# Patient Record
Sex: FEMALE | Race: ASIAN | NOT HISPANIC OR LATINO
[De-identification: names, ages, dates, MRNs, and addresses within clinical notes are randomized per-mention and may not be internally consistent; named-entity substitution may affect disease eponyms.]

---

## 2020-07-09 ENCOUNTER — APPOINTMENT (OUTPATIENT)
Dept: CARDIOLOGY | Facility: CLINIC | Age: 85
End: 2020-07-09
Payer: MEDICARE

## 2020-07-09 VITALS — RESPIRATION RATE: 16 BRPM | HEART RATE: 79 BPM | WEIGHT: 103 LBS

## 2020-07-09 DIAGNOSIS — I10 ESSENTIAL (PRIMARY) HYPERTENSION: ICD-10-CM

## 2020-07-09 DIAGNOSIS — E78.5 HYPERLIPIDEMIA, UNSPECIFIED: ICD-10-CM

## 2020-07-09 PROBLEM — Z00.00 ENCOUNTER FOR PREVENTIVE HEALTH EXAMINATION: Status: ACTIVE | Noted: 2020-07-09

## 2020-07-09 PROCEDURE — 99214 OFFICE O/P EST MOD 30 MIN: CPT

## 2020-07-09 PROCEDURE — 93000 ELECTROCARDIOGRAM COMPLETE: CPT

## 2020-07-09 RX ORDER — OLMESARTAN MEDOXOMIL 20 MG/1
20 TABLET, FILM COATED ORAL
Refills: 0 | Status: ACTIVE | COMMUNITY
Start: 2020-07-09

## 2020-07-09 RX ORDER — PITAVASTATIN CALCIUM 2.09 MG/1
2 TABLET, FILM COATED ORAL
Refills: 0 | Status: ACTIVE | COMMUNITY
Start: 2020-07-09

## 2020-07-09 RX ORDER — ASPIRIN 81 MG/1
81 TABLET ORAL
Refills: 0 | Status: ACTIVE | COMMUNITY
Start: 2020-07-09

## 2020-07-09 NOTE — HISTORY OF PRESENT ILLNESS
[FreeTextEntry1] : \par 1. HTN: on medications, controlled. No SE noted.\par \par 2. Hyperlipidemia: on livalo. Last . \par \par 3. AF: new. Patient recently had rectal surgery and was discharged in late June 2020. She was found to be in rapid AF HR 110s (new). She had mild palpitations but no dizziness. No CP or SOB. ET is limited post surgical status. She was found to be dehydrated and given IVF and now feels improved. She is in NSR today. Echo at [facility] showed LVEF 65%. \par \par She then went back into NSR but then on July 6th, went into AF again at a rate of 110s. Mild palpitations noted. No BB were given. She has taken ASA intermittently due to concerns about bleeding.\par \par No CP or SOB. ET is limited and she is in a wheelchair.\par \par \par \par PMHx/PSHx: reviewed and same as above \par \par Meds: benicar 20 livalo 2 \par \par ALL: NKDA       \par \par

## 2020-07-09 NOTE — PHYSICAL EXAM
[Normal Appearance] : normal appearance [General Appearance - Well Developed] : well developed [General Appearance - Well Nourished] : well nourished [Well Groomed] : well groomed [No Deformities] : no deformities [General Appearance - In No Acute Distress] : no acute distress [Normal Conjunctiva] : the conjunctiva exhibited no abnormalities [Eyelids - No Xanthelasma] : the eyelids demonstrated no xanthelasmas [Normal Oral Mucosa] : normal oral mucosa [No Oral Cyanosis] : no oral cyanosis [No Oral Pallor] : no oral pallor [Normal Jugular Venous V Waves Present] : normal jugular venous V waves present [Normal Jugular Venous A Waves Present] : normal jugular venous A waves present [No Jugular Venous Ruiz A Waves] : no jugular venous ruiz A waves [Respiration, Rhythm And Depth] : normal respiratory rhythm and effort [Exaggerated Use Of Accessory Muscles For Inspiration] : no accessory muscle use [Auscultation Breath Sounds / Voice Sounds] : lungs were clear to auscultation bilaterally [Heart Rate And Rhythm] : heart rate and rhythm were normal [Heart Sounds] : normal S1 and S2 [Murmurs] : no murmurs present [Abdomen Tenderness] : non-tender [Abdomen Soft] : soft [Abdomen Mass (___ Cm)] : no abdominal mass palpated [Nail Clubbing] : no clubbing of the fingernails [Cyanosis, Localized] : no localized cyanosis [Petechial Hemorrhages (___cm)] : no petechial hemorrhages [] : no rash [Skin Color & Pigmentation] : normal skin color and pigmentation [No Venous Stasis] : no venous stasis [Skin Lesions] : no skin lesions [No Skin Ulcers] : no skin ulcer [No Xanthoma] : no  xanthoma was observed [Oriented To Time, Place, And Person] : oriented to person, place, and time [Affect] : the affect was normal [Mood] : the mood was normal [No Anxiety] : not feeling anxious

## 2020-07-09 NOTE — REASON FOR VISIT
[Follow-Up - Clinic] : a clinic follow-up of [Atrial Fibrillation] : atrial fibrillation [Hypertension] : hypertension [FreeTextEntry1] : PMD: Dr. Kaden Oliver\par \par CC:  AF\par \par HPI:  87 F HTN hyperlipidemia with new AF.\par

## 2020-07-09 NOTE — DISCUSSION/SUMMARY
[FreeTextEntry1] : 88 F HTN hyperlipidemia with paroxysmal AF.\par Recommend use BB as tolerated.\par If bleeding improves, start ASA 81 QD.\par CHECK HOLTER DUE TO PALPITATIONS.\par CHECK NST TO ASSESS FOR ISCHEMIA (pharmacologic, limited ET wheelchair bound).\par

## 2020-07-31 ENCOUNTER — APPOINTMENT (OUTPATIENT)
Dept: CARDIOLOGY | Facility: CLINIC | Age: 85
End: 2020-07-31
Payer: MEDICARE

## 2020-07-31 DIAGNOSIS — I48.91 UNSPECIFIED ATRIAL FIBRILLATION: ICD-10-CM

## 2020-07-31 PROCEDURE — 93015 CV STRESS TEST SUPVJ I&R: CPT

## 2020-07-31 PROCEDURE — 78452 HT MUSCLE IMAGE SPECT MULT: CPT

## 2020-07-31 PROCEDURE — A9500: CPT

## 2020-08-11 ENCOUNTER — NON-APPOINTMENT (OUTPATIENT)
Age: 85
End: 2020-08-11

## 2021-01-25 ENCOUNTER — NON-APPOINTMENT (OUTPATIENT)
Age: 86
End: 2021-01-25

## 2021-01-25 RX ORDER — METOPROLOL TARTRATE 25 MG/1
25 TABLET, FILM COATED ORAL
Refills: 0 | Status: DISCONTINUED | COMMUNITY
Start: 2020-07-09 | End: 2021-01-25

## 2021-01-25 RX ORDER — METOPROLOL SUCCINATE 25 MG/1
25 TABLET, EXTENDED RELEASE ORAL
Refills: 0 | Status: ACTIVE | COMMUNITY
Start: 2021-01-25

## 2022-04-28 DIAGNOSIS — K92.2 GASTROINTESTINAL HEMORRHAGE, UNSPECIFIED: ICD-10-CM

## 2022-04-28 DIAGNOSIS — Z01.812 ENCOUNTER FOR PREPROCEDURAL LABORATORY EXAMINATION: ICD-10-CM

## 2022-05-09 LAB — SARS-COV-2 N GENE NPH QL NAA+PROBE: NOT DETECTED

## 2022-05-11 ENCOUNTER — OUTPATIENT (OUTPATIENT)
Dept: OUTPATIENT SERVICES | Facility: HOSPITAL | Age: 87
LOS: 1 days | End: 2022-05-11
Payer: MEDICARE

## 2022-05-11 ENCOUNTER — APPOINTMENT (OUTPATIENT)
Dept: GASTROENTEROLOGY | Facility: HOSPITAL | Age: 87
End: 2022-05-11

## 2022-05-11 ENCOUNTER — TRANSCRIPTION ENCOUNTER (OUTPATIENT)
Age: 87
End: 2022-05-11

## 2022-05-11 VITALS
DIASTOLIC BLOOD PRESSURE: 81 MMHG | RESPIRATION RATE: 17 BRPM | SYSTOLIC BLOOD PRESSURE: 139 MMHG | HEART RATE: 73 BPM | OXYGEN SATURATION: 100 %

## 2022-05-11 VITALS
HEIGHT: 61 IN | DIASTOLIC BLOOD PRESSURE: 82 MMHG | RESPIRATION RATE: 17 BRPM | OXYGEN SATURATION: 98 % | WEIGHT: 98.11 LBS | TEMPERATURE: 98 F | SYSTOLIC BLOOD PRESSURE: 160 MMHG | HEART RATE: 74 BPM

## 2022-05-11 DIAGNOSIS — K92.2 GASTROINTESTINAL HEMORRHAGE, UNSPECIFIED: ICD-10-CM

## 2022-05-11 DIAGNOSIS — Z98.890 OTHER SPECIFIED POSTPROCEDURAL STATES: Chronic | ICD-10-CM

## 2022-05-11 DIAGNOSIS — Z90.710 ACQUIRED ABSENCE OF BOTH CERVIX AND UTERUS: Chronic | ICD-10-CM

## 2022-05-11 PROCEDURE — C1889: CPT

## 2022-05-11 PROCEDURE — 45334 SIGMOIDOSCOPY FOR BLEEDING: CPT | Mod: GC

## 2022-05-11 PROCEDURE — 45330 DIAGNOSTIC SIGMOIDOSCOPY: CPT

## 2022-05-11 DEVICE — CLIP HEMO INSTINCT PLUS ENDOSCOPIC: Type: IMPLANTABLE DEVICE | Status: FUNCTIONAL

## 2022-05-11 DEVICE — CLIP RESOLUTION 360 235CM: Type: IMPLANTABLE DEVICE | Status: FUNCTIONAL

## 2022-05-11 RX ORDER — SODIUM CHLORIDE 9 MG/ML
1000 INJECTION, SOLUTION INTRAVENOUS
Refills: 0 | Status: DISCONTINUED | OUTPATIENT
Start: 2022-05-11 | End: 2022-05-25

## 2022-05-11 RX ORDER — METOPROLOL TARTRATE 50 MG
0.5 TABLET ORAL
Qty: 0 | Refills: 0 | DISCHARGE

## 2022-05-11 RX ORDER — ASPIRIN/CALCIUM CARB/MAGNESIUM 324 MG
1 TABLET ORAL
Qty: 0 | Refills: 0 | DISCHARGE

## 2022-05-11 RX ORDER — SODIUM CHLORIDE 9 MG/ML
500 INJECTION INTRAMUSCULAR; INTRAVENOUS; SUBCUTANEOUS
Refills: 0 | Status: DISCONTINUED | OUTPATIENT
Start: 2022-05-11 | End: 2022-05-25

## 2022-05-11 RX ORDER — OLMESARTAN MEDOXOMIL 5 MG/1
1 TABLET, FILM COATED ORAL
Qty: 0 | Refills: 0 | DISCHARGE

## 2022-05-11 RX ORDER — PITAVASTATIN CALCIUM 1.04 MG/1
1 TABLET, FILM COATED ORAL
Qty: 0 | Refills: 0 | DISCHARGE

## 2022-05-11 NOTE — PRE-ANESTHESIA EVALUATION ADULT - NSANTHPMHFT_GEN_ALL_CORE
89 yo F w/ PMHx of HTN, HLF, PAF bw/ rectal bleeding. Presents today for flexible sigmoidoscopy.    Denies active CP/SOB/Orthopnea  Denies hx of MI/CHF

## 2022-05-11 NOTE — ASU PATIENT PROFILE, ADULT - FALL HARM RISK - HARM RISK INTERVENTIONS

## 2022-05-11 NOTE — PRE-ANESTHESIA EVALUATION ADULT - NSANTHOSAYNRD_GEN_A_CORE
No. VITALIY screening performed.  STOP BANG Legend: 0-2 = LOW Risk; 3-4 = INTERMEDIATE Risk; 5-8 = HIGH Risk

## 2022-05-11 NOTE — ASU DISCHARGE PLAN (ADULT/PEDIATRIC) - NS MD DC FALL RISK RISK
For information on Fall & Injury Prevention, visit: [email or web address] OR  [email or web address] OR  https://www.cdc.gov/steadi/patient.html

## 2022-05-11 NOTE — PRE PROCEDURE NOTE - PRE PROCEDURE EVALUATION
Attending Physician:   Dr. Jeff Ruiz                         Procedure: Flex Sig     Indication for Procedure: evaluate for source of GIB   ________________________________________________________  PAST MEDICAL & SURGICAL HISTORY:    ALLERGIES:  Allergy Status Unknown    HOME MEDICATIONS:    AICD/PPM: [ ] yes   [ ] no    PERTINENT LAB DATA:                      PHYSICAL EXAMINATION:    T(C): --  HR: --  BP: --  RR: --  SpO2: --    Constitutional: NAD  HEENT: PERRLA, EOMI,    Neck:  No JVD  Respiratory: CTAB/L  Cardiovascular: S1 and S2  Gastrointestinal: BS+, soft, NT/ND  Extremities: No peripheral edema  Neurological: A/O x 3, no focal deficits  Psychiatric: Normal mood, normal affect  Skin: No rashes    ASA Class: I [ ]  II [ ]  III [ ]  IV [ ]    COMMENTS:    The patient is a suitable candidate for the planned procedure unless box checked [ ]  No, explain:

## 2023-05-26 ENCOUNTER — NON-APPOINTMENT (OUTPATIENT)
Age: 88
End: 2023-05-26

## 2024-04-04 PROBLEM — I10 ESSENTIAL (PRIMARY) HYPERTENSION: Chronic | Status: ACTIVE | Noted: 2022-05-11

## 2024-04-04 PROBLEM — E78.00 PURE HYPERCHOLESTEROLEMIA, UNSPECIFIED: Chronic | Status: ACTIVE | Noted: 2022-05-11

## 2024-04-04 PROBLEM — I48.91 UNSPECIFIED ATRIAL FIBRILLATION: Chronic | Status: ACTIVE | Noted: 2022-05-11

## 2024-08-30 ENCOUNTER — NON-APPOINTMENT (OUTPATIENT)
Age: 89
End: 2024-08-30

## 2024-08-30 ENCOUNTER — APPOINTMENT (OUTPATIENT)
Dept: ELECTROPHYSIOLOGY | Facility: CLINIC | Age: 89
End: 2024-08-30
Payer: MEDICARE

## 2024-08-30 VITALS
OXYGEN SATURATION: 98 % | SYSTOLIC BLOOD PRESSURE: 93 MMHG | BODY MASS INDEX: 19.26 KG/M2 | HEIGHT: 61 IN | RESPIRATION RATE: 14 BRPM | HEART RATE: 101 BPM | DIASTOLIC BLOOD PRESSURE: 65 MMHG | WEIGHT: 102 LBS

## 2024-08-30 DIAGNOSIS — I48.91 UNSPECIFIED ATRIAL FIBRILLATION: ICD-10-CM

## 2024-08-30 DIAGNOSIS — G20.A1 PARKINSON'S DISEASE WITHOUT DYSKINESIA, WITHOUT MENTION OF FLUCTUATIONS: ICD-10-CM

## 2024-08-30 PROCEDURE — 99205 OFFICE O/P NEW HI 60 MIN: CPT

## 2024-08-30 PROCEDURE — 93000 ELECTROCARDIOGRAM COMPLETE: CPT

## 2024-08-30 RX ORDER — CALCIUM CARBONATE 500(1250)
TABLET,CHEWABLE ORAL DAILY
Refills: 0 | Status: ACTIVE | COMMUNITY

## 2024-08-30 RX ORDER — CARBIDOPA AND LEVODOPA 25; 100 MG/1; MG/1
25-100 TABLET ORAL 3 TIMES DAILY
Refills: 0 | Status: ACTIVE | COMMUNITY

## 2024-08-30 NOTE — PHYSICAL EXAM
[Well Developed] : well developed [Well Nourished] : well nourished [No Acute Distress] : no acute distress [Normal Conjunctiva] : normal conjunctiva [Normal Venous Pressure] : normal venous pressure [Normal S1, S2] : normal S1, S2 [No Murmur] : no murmur [No Rub] : no rub [No Gallop] : no gallop [Clear Lung Fields] : clear lung fields [Good Air Entry] : good air entry [No Respiratory Distress] : no respiratory distress  [Soft] : abdomen soft [Non Tender] : non-tender [No Masses/organomegaly] : no masses/organomegaly [Normal Bowel Sounds] : normal bowel sounds [No Edema] : no edema [No Cyanosis] : no cyanosis [No Clubbing] : no clubbing [No Varicosities] : no varicosities [No Rash] : no rash [No Skin Lesions] : no skin lesions [Alert and Oriented] : alert and oriented [de-identified] : tired-appearing, occasionally sleeping in her chair [de-identified] : irregularly irregular [de-identified] : wheelchair-bound

## 2024-08-30 NOTE — PHYSICAL EXAM
[Well Developed] : well developed [Well Nourished] : well nourished [No Acute Distress] : no acute distress [Normal Conjunctiva] : normal conjunctiva [Normal Venous Pressure] : normal venous pressure [Normal S1, S2] : normal S1, S2 [No Murmur] : no murmur [No Rub] : no rub [No Gallop] : no gallop [Clear Lung Fields] : clear lung fields [Good Air Entry] : good air entry [No Respiratory Distress] : no respiratory distress  [Soft] : abdomen soft [Non Tender] : non-tender [No Masses/organomegaly] : no masses/organomegaly [Normal Bowel Sounds] : normal bowel sounds [No Edema] : no edema [No Cyanosis] : no cyanosis [No Clubbing] : no clubbing [No Varicosities] : no varicosities [No Rash] : no rash [No Skin Lesions] : no skin lesions [Alert and Oriented] : alert and oriented [de-identified] : tired-appearing, occasionally sleeping in her chair [de-identified] : irregularly irregular [de-identified] : wheelchair-bound

## 2024-08-30 NOTE — CARDIOLOGY SUMMARY
[de-identified] : ECG from 8/30/2024: Atrial fibrillation at 95bpm ECG from 7/6/2020: AF [de-identified] : Pharmacologic nuclear stress test from 7/31/2020: small, mild defect in the inferior wall this is fixed, suggestive of diaphragmatic attenuation artifact, normal myocardial perfusion, EF: 57%

## 2024-08-30 NOTE — DISCUSSION/SUMMARY
[FreeTextEntry1] : Kam Oliver is a 89-year-old woman with hypertension, hyperlipidemia, Parkinson's dementia, rectal bleeding and paroxysmal atrial fibrillation.   We discussed that her mother may derive symptom benefit from maintenance of sinus rhythm. However, given her elevated CHADSVASC score, rhythm control with an antiarrhythmic medication is not recommended unless she is able to tolerate anticoagulation (concern for possible chemical cardioversion resulting in a stroke or systemic embolization). We spoke about the possibility of a pacemaker implant and an AVJ ablation. For now, given the patient's daughter's reluctance to use oral anticoagulation, continued monitoring is recommended. The patient's daughter will contact our office if there are any additional questions or concerns. [EKG obtained to assist in diagnosis and management of assessed problem(s)] : EKG obtained to assist in diagnosis and management of assessed problem(s)

## 2024-08-30 NOTE — CARDIOLOGY SUMMARY
[de-identified] : ECG from 8/30/2024: Atrial fibrillation at 95bpm ECG from 7/6/2020: AF [de-identified] : Pharmacologic nuclear stress test from 7/31/2020: small, mild defect in the inferior wall this is fixed, suggestive of diaphragmatic attenuation artifact, normal myocardial perfusion, EF: 57%

## 2024-08-30 NOTE — HISTORY OF PRESENT ILLNESS
[FreeTextEntry1] : Kam Oliver is a 89-year-old woman with hypertension, hyperlipidemia, Parkinson's dementia, rectal bleeding, and paroxysmal atrial fibrillation. She is accompanied by her daughter, who is an internist and is conducting the interview. Ms. Oliver presents today for an initial evaluation.  Her atrial fibrillation was diagnosed in 2020. Her paroxysms have been noted to occur with greater frequency and for a longer duration as time has progressed. For the past two months, her episodes have been occuring weekly. The episodes last between hours and days. She has been observed to have dyspnea and hypotension (blood pressures in the 90s/60s) associated with these episodes. She was recommended to start Multaq several months ago by her Cardiologist. Her daughter is hesitant for her mother to start this medication out of concern for possible bradycardia. Additionally, her daughter prefers to avoid Amiodarone. Lastly, the patient was recommended to start anticoagulation due to an elevated CHADS2-VASC score. She has not done this due to a history of rectal bleeds (due to polyps).   As of July of 2020, the patient has been wheelchair bound. CHADS2-VASC: 4 (Age: 2, HTN: 1, F: 1)

## 2024-08-30 NOTE — PHYSICAL EXAM
[Well Developed] : well developed [Well Nourished] : well nourished [No Acute Distress] : no acute distress [Normal Conjunctiva] : normal conjunctiva [Normal Venous Pressure] : normal venous pressure [Normal S1, S2] : normal S1, S2 [No Murmur] : no murmur [No Rub] : no rub [No Gallop] : no gallop [Clear Lung Fields] : clear lung fields [Good Air Entry] : good air entry [No Respiratory Distress] : no respiratory distress  [Soft] : abdomen soft [Non Tender] : non-tender [No Masses/organomegaly] : no masses/organomegaly [Normal Bowel Sounds] : normal bowel sounds [No Edema] : no edema [No Cyanosis] : no cyanosis [No Clubbing] : no clubbing [No Varicosities] : no varicosities [No Rash] : no rash [No Skin Lesions] : no skin lesions [Alert and Oriented] : alert and oriented [de-identified] : tired-appearing, occasionally sleeping in her chair [de-identified] : irregularly irregular [de-identified] : wheelchair-bound

## 2024-08-30 NOTE — CARDIOLOGY SUMMARY
[de-identified] : ECG from 8/30/2024: Atrial fibrillation at 95bpm ECG from 7/6/2020: AF [de-identified] : Pharmacologic nuclear stress test from 7/31/2020: small, mild defect in the inferior wall this is fixed, suggestive of diaphragmatic attenuation artifact, normal myocardial perfusion, EF: 57%

## 2024-09-12 ENCOUNTER — NON-APPOINTMENT (OUTPATIENT)
Age: 89
End: 2024-09-12

## 2024-09-13 ENCOUNTER — APPOINTMENT (OUTPATIENT)
Dept: NEUROLOGY | Facility: CLINIC | Age: 89
End: 2024-09-13
Payer: MEDICARE

## 2024-09-13 VITALS
HEART RATE: 60 BPM | WEIGHT: 102 LBS | HEIGHT: 61 IN | DIASTOLIC BLOOD PRESSURE: 76 MMHG | SYSTOLIC BLOOD PRESSURE: 153 MMHG | BODY MASS INDEX: 19.26 KG/M2

## 2024-09-13 PROCEDURE — 99205 OFFICE O/P NEW HI 60 MIN: CPT

## 2024-09-13 RX ORDER — CARBIDOPA AND LEVODOPA 25; 100 MG/1; MG/1
25-100 TABLET, EXTENDED RELEASE ORAL 3 TIMES DAILY
Qty: 270 | Refills: 3 | Status: ACTIVE | COMMUNITY
Start: 2024-09-13 | End: 1900-01-01

## 2024-09-13 NOTE — HISTORY OF PRESENT ILLNESS
[FreeTextEntry1] : 89F who is seeing me for management of PD. She saw. Dr. Grimes at [facility] 1 yr ago who diagnosed patient. Her initial symptoms   She was started on sinemet  1 tab TID. Higer doses caused drowsiness. She is less rigidity with levodopa. Her gait is impaired and has been using a walker for 2 years. She Had multiple falls at that time.  Since starting L-dopa her gait has improved Pt needs assistance with all ADLs. Has done home PT.She continues to get drowsy after ea dose of sinemet. She is rigid overnight and dtr has difficult getting her out of bed.  She has hx of lumbar stenosis s/p MELY 2 months ago with reduction in her chronic back pain  Nonmotor:  + constipation managed with senna + circadian cycle is reversed. + nocturia - RBD symptoms Her memory is OK. There is no VH.  Mild dysphagia : minced/puree  PD meds sinemet  1 tabs TID (8-1-8)  Meds synthroid statin asa 81mg qd   PMH: afib, HTN, HLD

## 2024-09-13 NOTE — PHYSICAL EXAM
[Person] : oriented to person [Place] : oriented to place [Time] : oriented to time [Cranial Nerves Oculomotor (III)] : extraocular motion intact [Cranial Nerves Facial (VII)] : face symmetrical [Motor Strength] : muscle strength was normal in all four extremities [1+] : Ankle jerk left 1+ [FreeTextEntry1] : @+ masking. Mild hypophonia. There is no tremor. There is mild-moderate. left greater right bradykinesia with 2+ cogwheeling in her left wrist. There is no dysmetria. She needs assistance to stand and shuffles with en bloc turns. THere is moderate stooped posture. Postural reflexes impaired.

## 2024-09-13 NOTE — DISCUSSION/SUMMARY
[FreeTextEntry1] : Parkinsonism x 2 years with gait disorder with overnight akinesia. LD doses are far apart and causing a level of underdosing. LD triggering somnolence also a problem as well. Cognition is stable for her age. Sleep cycle is very fragmented  Patient was counseled on the following recommendations: trial of switching IR for ER sinemet  1 tab TID  start melatonin 3mg hs and avoid screens at night restart PT cont senna for constipation  f/u 4months

## 2025-05-18 ENCOUNTER — EMERGENCY (EMERGENCY)
Facility: HOSPITAL | Age: 89
LOS: 1 days | End: 2025-05-18
Attending: EMERGENCY MEDICINE
Payer: MEDICARE

## 2025-05-18 VITALS
TEMPERATURE: 98 F | SYSTOLIC BLOOD PRESSURE: 152 MMHG | DIASTOLIC BLOOD PRESSURE: 82 MMHG | RESPIRATION RATE: 18 BRPM | OXYGEN SATURATION: 98 % | HEART RATE: 68 BPM

## 2025-05-18 VITALS
OXYGEN SATURATION: 97 % | SYSTOLIC BLOOD PRESSURE: 135 MMHG | RESPIRATION RATE: 18 BRPM | HEART RATE: 67 BPM | DIASTOLIC BLOOD PRESSURE: 67 MMHG

## 2025-05-18 DIAGNOSIS — Z98.890 OTHER SPECIFIED POSTPROCEDURAL STATES: Chronic | ICD-10-CM

## 2025-05-18 DIAGNOSIS — Z90.710 ACQUIRED ABSENCE OF BOTH CERVIX AND UTERUS: Chronic | ICD-10-CM

## 2025-05-18 LAB
ALBUMIN SERPL ELPH-MCNC: 3.9 G/DL — SIGNIFICANT CHANGE UP (ref 3.3–5)
ALP SERPL-CCNC: 75 U/L — SIGNIFICANT CHANGE UP (ref 40–120)
ALT FLD-CCNC: 10 U/L — SIGNIFICANT CHANGE UP (ref 10–45)
ANION GAP SERPL CALC-SCNC: 10 MMOL/L — SIGNIFICANT CHANGE UP (ref 5–17)
APTT BLD: 31.8 SEC — SIGNIFICANT CHANGE UP (ref 26.1–36.8)
AST SERPL-CCNC: 18 U/L — SIGNIFICANT CHANGE UP (ref 10–40)
BASOPHILS # BLD AUTO: 0.03 K/UL — SIGNIFICANT CHANGE UP (ref 0–0.2)
BASOPHILS NFR BLD AUTO: 0.6 % — SIGNIFICANT CHANGE UP (ref 0–2)
BILIRUB SERPL-MCNC: 0.5 MG/DL — SIGNIFICANT CHANGE UP (ref 0.2–1.2)
BUN SERPL-MCNC: 25 MG/DL — HIGH (ref 7–23)
CALCIUM SERPL-MCNC: 9.5 MG/DL — SIGNIFICANT CHANGE UP (ref 8.4–10.5)
CHLORIDE SERPL-SCNC: 105 MMOL/L — SIGNIFICANT CHANGE UP (ref 96–108)
CO2 SERPL-SCNC: 24 MMOL/L — SIGNIFICANT CHANGE UP (ref 22–31)
CREAT SERPL-MCNC: 1.04 MG/DL — SIGNIFICANT CHANGE UP (ref 0.5–1.3)
EGFR: 50 ML/MIN/1.73M2 — LOW
EGFR: 50 ML/MIN/1.73M2 — LOW
EOSINOPHIL # BLD AUTO: 0.06 K/UL — SIGNIFICANT CHANGE UP (ref 0–0.5)
EOSINOPHIL NFR BLD AUTO: 1.2 % — SIGNIFICANT CHANGE UP (ref 0–6)
GLUCOSE SERPL-MCNC: 129 MG/DL — HIGH (ref 70–99)
HCT VFR BLD CALC: 33.6 % — LOW (ref 34.5–45)
HGB BLD-MCNC: 10.6 G/DL — LOW (ref 11.5–15.5)
IMM GRANULOCYTES NFR BLD AUTO: 0.4 % — SIGNIFICANT CHANGE UP (ref 0–0.9)
INR BLD: 0.96 RATIO — SIGNIFICANT CHANGE UP (ref 0.85–1.16)
LYMPHOCYTES # BLD AUTO: 2.16 K/UL — SIGNIFICANT CHANGE UP (ref 1–3.3)
LYMPHOCYTES # BLD AUTO: 43.4 % — SIGNIFICANT CHANGE UP (ref 13–44)
MCHC RBC-ENTMCNC: 29.8 PG — SIGNIFICANT CHANGE UP (ref 27–34)
MCHC RBC-ENTMCNC: 31.5 G/DL — LOW (ref 32–36)
MCV RBC AUTO: 94.4 FL — SIGNIFICANT CHANGE UP (ref 80–100)
MONOCYTES # BLD AUTO: 0.4 K/UL — SIGNIFICANT CHANGE UP (ref 0–0.9)
MONOCYTES NFR BLD AUTO: 8 % — SIGNIFICANT CHANGE UP (ref 2–14)
NEUTROPHILS # BLD AUTO: 2.31 K/UL — SIGNIFICANT CHANGE UP (ref 1.8–7.4)
NEUTROPHILS NFR BLD AUTO: 46.4 % — SIGNIFICANT CHANGE UP (ref 43–77)
NRBC BLD AUTO-RTO: 0 /100 WBCS — SIGNIFICANT CHANGE UP (ref 0–0)
PLATELET # BLD AUTO: 194 K/UL — SIGNIFICANT CHANGE UP (ref 150–400)
POTASSIUM SERPL-MCNC: 4.5 MMOL/L — SIGNIFICANT CHANGE UP (ref 3.5–5.3)
POTASSIUM SERPL-SCNC: 4.5 MMOL/L — SIGNIFICANT CHANGE UP (ref 3.5–5.3)
PROT SERPL-MCNC: 6.5 G/DL — SIGNIFICANT CHANGE UP (ref 6–8.3)
PROTHROM AB SERPL-ACNC: 11 SEC — SIGNIFICANT CHANGE UP (ref 9.9–13.4)
RBC # BLD: 3.56 M/UL — LOW (ref 3.8–5.2)
RBC # FLD: 13.9 % — SIGNIFICANT CHANGE UP (ref 10.3–14.5)
SODIUM SERPL-SCNC: 139 MMOL/L — SIGNIFICANT CHANGE UP (ref 135–145)
TROPONIN T, HIGH SENSITIVITY RESULT: 42 NG/L — SIGNIFICANT CHANGE UP (ref 0–51)
WBC # BLD: 4.98 K/UL — SIGNIFICANT CHANGE UP (ref 3.8–10.5)
WBC # FLD AUTO: 4.98 K/UL — SIGNIFICANT CHANGE UP (ref 3.8–10.5)

## 2025-05-18 PROCEDURE — 85018 HEMOGLOBIN: CPT

## 2025-05-18 PROCEDURE — 70498 CT ANGIOGRAPHY NECK: CPT

## 2025-05-18 PROCEDURE — 99291 CRITICAL CARE FIRST HOUR: CPT | Mod: 25

## 2025-05-18 PROCEDURE — 85610 PROTHROMBIN TIME: CPT

## 2025-05-18 PROCEDURE — 85014 HEMATOCRIT: CPT

## 2025-05-18 PROCEDURE — 83605 ASSAY OF LACTIC ACID: CPT

## 2025-05-18 PROCEDURE — 99291 CRITICAL CARE FIRST HOUR: CPT | Mod: GC

## 2025-05-18 PROCEDURE — 82435 ASSAY OF BLOOD CHLORIDE: CPT

## 2025-05-18 PROCEDURE — 85730 THROMBOPLASTIN TIME PARTIAL: CPT

## 2025-05-18 PROCEDURE — 0042T: CPT

## 2025-05-18 PROCEDURE — 82962 GLUCOSE BLOOD TEST: CPT

## 2025-05-18 PROCEDURE — 82330 ASSAY OF CALCIUM: CPT

## 2025-05-18 PROCEDURE — 93005 ELECTROCARDIOGRAM TRACING: CPT

## 2025-05-18 PROCEDURE — 82947 ASSAY GLUCOSE BLOOD QUANT: CPT

## 2025-05-18 PROCEDURE — 70450 CT HEAD/BRAIN W/O DYE: CPT

## 2025-05-18 PROCEDURE — 84295 ASSAY OF SERUM SODIUM: CPT

## 2025-05-18 PROCEDURE — 93010 ELECTROCARDIOGRAM REPORT: CPT

## 2025-05-18 PROCEDURE — 84484 ASSAY OF TROPONIN QUANT: CPT

## 2025-05-18 PROCEDURE — 84132 ASSAY OF SERUM POTASSIUM: CPT

## 2025-05-18 PROCEDURE — 70496 CT ANGIOGRAPHY HEAD: CPT | Mod: 26

## 2025-05-18 PROCEDURE — 82803 BLOOD GASES ANY COMBINATION: CPT

## 2025-05-18 PROCEDURE — 36000 PLACE NEEDLE IN VEIN: CPT | Mod: XU

## 2025-05-18 PROCEDURE — 85025 COMPLETE CBC W/AUTO DIFF WBC: CPT

## 2025-05-18 PROCEDURE — 70496 CT ANGIOGRAPHY HEAD: CPT

## 2025-05-18 PROCEDURE — 70450 CT HEAD/BRAIN W/O DYE: CPT | Mod: 26,XU

## 2025-05-18 PROCEDURE — 70498 CT ANGIOGRAPHY NECK: CPT | Mod: 26

## 2025-05-18 PROCEDURE — 80053 COMPREHEN METABOLIC PANEL: CPT

## 2025-05-18 NOTE — ED PROVIDER NOTE - PHYSICAL EXAMINATION
CONSTITUTIONAL: awake; in no acute distress.   HEAD: Normocephalic; atraumatic.  EYES: no conjunctival injection. PERRL. EOMI  ENT: No nasal discharge; airway clear.  NECK: Supple; non tender, good rom.  CARD: S1, S2 normal; regular rate  RESP: No wheezes, rales or rhonchi. Good air movement bilaterally.   ABD: soft ntnd, no guarding, no distention, no rigidity.   EXT:  No cyanosis or edema.   NEURO: alert, oriented to ppte, cn 2-12 intact, motor strength intact to b/l upper extremities but with some RLE drift against gravity, sensation intact throughout, coordination intact. Speech clear.  PSYCH: Cooperative, appropriate.

## 2025-05-18 NOTE — ED ADULT NURSE NOTE - NSFALLHARMRISKINTERV_ED_ALL_ED

## 2025-05-18 NOTE — ED ADULT NURSE NOTE - OBJECTIVE STATEMENT
89Y F AXO3 PMH of afib on aspirin presented to the ED from home via EMS c/o RUE and RLE weakness 40 mins prior to arrival. Pt is primarily Uzbek speaking pt's daughter at bedside to translate. Upon arrival to the ED, the pt is well appearing, has bilateral even and unlabored chest rise, and airway is patent. Upon assessment, pt has even and bilateral peripheral pulses, ROM limited to R UE and LE , PERRLA, gross neuro intact, soft, non-tender, non-distended abdomen. Pt denies fevers, chills, chest pain, SOB, n/v/d, headache, dizziness, urinary symptoms, numbness or tingling, and dark and bloody stools. Comfort and safety provided, bed in lowest position, side rails up.

## 2025-05-18 NOTE — ED PROVIDER NOTE - OBJECTIVE STATEMENT
89-year-old female past medical history with past medical history A-fib previously on anticoagulation complicated by GI bleeding now off anticoagulation presenting for right-sided weakness and speech difficulty.  Patient with daughter at bedside.  Last known normal at 9:30 AM, had acute onset witnessed weakness to the right upper and lower extremity with difficulty moving it.  Additionally was unable to speak at that time though was still awake and trying to speak. Symptoms have improved since arrival to ED. No headache, dizziness, chest pain, fevers.

## 2025-05-18 NOTE — STROKE CODE NOTE - NIH STROKE SCALE: 5A. MOTOR ARM, LEFT, QM
(0) No drift; limb holds 90 (or 45) degrees for full 10 secs Doxepin Pregnancy And Lactation Text: This medication is Pregnancy Category C and it isn't known if it is safe during pregnancy. It is also excreted in breast milk and breast feeding isn't recommended.

## 2025-05-18 NOTE — ED PROVIDER NOTE - NSFOLLOWUPINSTRUCTIONS_ED_ALL_ED_FT
You were seen in the ED for right-sided weakness and speech changes    Your evaluation including CT scan, blood test showed no findings requiring further evaluation or treatment in the hospital at this time.    Please follow up with your PCP as discussed within 1 week. Discuss your symptoms, medications, and results in this packet.  Additionally follow-up with neurology, see contact information in this packet to schedule an appointment.  Continue taking your aspirin.    Seek immediate medical attention if you experience new or worsening symptoms, including new or worsening weakness, confusion, inability to speak, vision changes.

## 2025-05-18 NOTE — CONSULT NOTE ADULT - ASSESSMENT
INCOMPLETE    Assessment: ***. Initial VS in ED: ***. Exam: ***.      mRS: ***  LKN: ***  NIHSS: ***    *** a tenecteplase candidate due to ***.  *** a mechanical thrombectomy candidate due to ***.    Impression: ***    Recommendations:    Diagnostics:    * Case and plan not final until Attending attestation * Assessment: 89F PMHx HTN, HLD, Afib off AC due to rectal bleeding, TIA, Parkinson disease who presented as code stroke for R side weakness. Patient was being bathed by her daughter (who works as IM physician) when she experienced sudden onset complete right sided weakness and difficulty speaking. Upon arrival to the ED, patient noted with improvement of her weakness and aphasia with exception of mild RLE weakness. Initial VS in ED: /67. Exam: AAOx2 (baseline), equal strength in bilateral upper extremities, mild RLE drift and slight weakness of RLE compared to LLE. Parkinsons features noted including masked facies, bradykinesia, rigidity- of note patient recently had Sinemet discontinued per daughter due to concern that it was causing orthostatic hypotension. Imaging performed reveals: CT head no acute pathology. CTA without significant flow limiting stenosis or large vessel occlusion. CT perfusion reveals mild area of territory at risk in posterior R frontal/parietal region without core.      pre-mRS: 4  LKN: 5/18 @0930  NIHSS: 2    Not a tenecteplase candidate due to low NIHSS, family refusal.  Not a mechanical thrombectomy candidate due to no LVO.    Impression: Initially with complete R hemiparesis and aphasia which improved and now with mild RLE weakness due to L brain dysfunction. Likely TIA vs small stroke, mechanism cardioembolic in setting of TIA off AC.    Recommendations:  [] No inpatient neurological workup, patient and family prefer to return home  [] Permissive HTN to 220/120 for 48 hours followed by gradual normotension 130/80 explained to daughter who is a physician and expressed understanding. Patient is not on any home antihypertensive medications.  [] Ideally patient would be on anticoagulation for stroke prevention given Afib, however unable to take due to rectal bleeding per daughter. Defer further management of paroxysmal Afib including consideration of Watchman procedure to cardiology  [] Continue home aspirin 81 mg  [] Statin as per ASCVD score    Discussed with stroke fellow Dr. Arredondo under supervision of attending Dr. Janett Dobbs. Case and plan not final until Attending attestation * Assessment: 89F PMHx HTN, HLD, Afib off AC due to rectal bleeding, TIA, Parkinson disease who presented as code stroke for R side weakness. Patient was being bathed by her daughter (who works as IM physician) when she experienced sudden onset complete right sided weakness and difficulty speaking. Upon arrival to the ED, patient noted with improvement of her weakness and aphasia with exception of mild RLE weakness. Initial VS in ED: /67. Exam: AAOx2 (baseline), equal strength in bilateral upper extremities, mild RLE drift and slight weakness of RLE compared to LLE. Parkinsons features noted including masked facies, bradykinesia, rigidity- of note patient recently had Sinemet discontinued per daughter due to concern that it was causing orthostatic hypotension. Imaging performed reveals: CT head no acute pathology. CTA without significant flow limiting stenosis or large vessel occlusion. CT perfusion reveals mild area of territory at risk in posterior R frontal/parietal region without core.      pre-mRS: 4  LKN: 5/18 @0930  NIHSS: 2    Not a tenecteplase candidate due to low NIHSS, family refusal.  Not a mechanical thrombectomy candidate due to no LVO.    Impression: Initially with complete R hemiparesis and aphasia which improved and now with mild RLE weakness due to L brain dysfunction. Likely acute ischemic stroke, mechanism cardioembolic in setting of TIA off AC.    Recommendations:  [] No inpatient neurological workup, patient and family prefer to return home  [] Permissive HTN to 220/120 for 48 hours followed by gradual normotension 130/80 explained to daughter who is a physician and expressed understanding. Patient is not on any home antihypertensive medications.  [] Ideally patient would be on anticoagulation for stroke prevention given Afib, however unable to take due to rectal bleeding per daughter. Defer further management of paroxysmal Afib including consideration of Watchman procedure to cardiology  [] Continue home aspirin 81 mg  [] Statin as per ASCVD score    Discussed with stroke fellow Dr. Arredondo under supervision of attending Dr. Janett Dobbs. Case and plan not final until Attending attestation * Assessment: 89F PMHx HTN, HLD, Afib off AC due to rectal bleeding, TIA, Parkinson disease who presented as code stroke for R side weakness. Patient was being bathed by her daughter (who works as IM physician) when she experienced sudden onset complete right sided weakness and difficulty speaking. Upon arrival to the ED, patient noted with improvement of her weakness and aphasia with exception of mild RLE weakness. Initial VS in ED: /67. Exam: AAOx2 (baseline), equal strength in bilateral upper extremities, mild RLE drift and slight weakness of RLE compared to LLE. Parkinsons features noted including masked facies, bradykinesia, rigidity- of note patient recently had Sinemet discontinued per daughter due to concern that it was causing orthostatic hypotension. Imaging performed reveals: CT head no acute pathology. CTA without significant flow limiting stenosis or large vessel occlusion. CT perfusion reveals mild area of territory at risk in posterior R frontal/parietal region without core.      pre-mRS: 4  LKN: 5/18 @0930  NIHSS: 2    Not a tenecteplase candidate due to low NIHSS, family refusal.  Not a mechanical thrombectomy candidate due to no LVO.    Impression: Initially with complete R hemiparesis and aphasia which improved and now with mild RLE weakness due to L brain dysfunction. Likely acute ischemic stroke, mechanism cardioembolic in setting of TIA off AC.    Recommendations:  [] No inpatient neurological workup, patient and family prefer to return home  [] Permissive HTN to 220/120 for 48 hours followed by gradual normotension 130/80 explained to daughter who is a physician and expressed understanding. Patient is not on any home antihypertensive medications.  [] Ideally patient would be on anticoagulation for stroke prevention given Afib, however unable to take due to rectal bleeding per daughter. Defer further management of paroxysmal Afib including consideration of Watchman procedure to cardiology  [] Continue home aspirin 81 mg  [] Statin as per ASCVD score  [] Regarding worsening rigidity/cramps, likely in setting of sudden Sinemet discontinuation. Discussed with daughter recommendations to restart home Sinemet if significant symptoms and discuss with Dr. Machado appropriate tapering regimen.  [] Follow up outpatient with movement specialist Dr. Machado  [] Flexeril 5 mg as needed (up to TID) for muscle cramps  [] Patient can follow up with stroke neurology at [address] [place] [address] 1-2 weeks after discharge. Please instruct the patient to call [phone] to schedule this appointment.     Discussed with stroke fellow Dr. Arredondo under supervision of attending Dr. Janett Dobbs. Also discussed with general neurology attending Dr. Nguyen. Case and plan not final until Attending attestation. Assessment: 89F PMHx HTN, HLD, Afib off AC due to rectal bleeding, TIA, Parkinson disease who presented as code stroke for R side weakness. Patient was being bathed by her daughter (who works as IM physician) when she experienced sudden onset complete right sided weakness and difficulty speaking. Upon arrival to the ED, patient noted with improvement of her weakness and aphasia with exception of mild RLE weakness. Initial VS in ED: /67. Exam: AAOx2 (baseline), equal strength in bilateral upper extremities, mild RLE drift and slight weakness of RLE compared to LLE. Parkinsons features noted including masked facies, bradykinesia, rigidity- of note patient recently had Sinemet discontinued per daughter due to concern that it was causing orthostatic hypotension. Imaging performed reveals: CT head no acute pathology. CTA without significant flow limiting stenosis or large vessel occlusion. CT perfusion reveals mild area of territory at risk in posterior R frontal/parietal region without core.      pre-mRS: 4  LKN: 5/18 @0930  NIHSS: 2    Not a tenecteplase candidate due to low NIHSS, family refusal.  Not a mechanical thrombectomy candidate due to no LVO.    Impression: Initially with complete R hemiparesis and aphasia which improved and now with mild RLE weakness due to L brain dysfunction. Likely acute ischemic stroke, mechanism cardioembolic in setting of TIA off AC.    Recommendations:  [] No inpatient neurological workup, patient and family prefer to return home  [] Permissive HTN to 220/120 for 48 hours followed by gradual normotension 130/80 explained to daughter who is a physician and expressed understanding. Patient is not on any home antihypertensive medications.  [] Ideally patient would be on anticoagulation for stroke prevention given Afib, however unable to take due to rectal bleeding per daughter. Defer further management of paroxysmal Afib including consideration of Watchman procedure to cardiology  [] Continue home aspirin 81 mg  [] Statin as per ASCVD score  [] Regarding worsening rigidity/cramps, likely in setting of sudden Sinemet discontinuation. Discussed with daughter recommendations to restart home Sinemet if significant symptoms and discuss with Dr. Machado appropriate tapering regimen.  [] Follow up outpatient with movement specialist Dr. Machado  [] Flexeril 5 mg as needed (up to TID) for muscle cramps  [] Patient can follow up with stroke neurology at [address] [place] [address] 1-2 weeks after discharge. Please instruct the patient to call [phone] to schedule this appointment.     Discussed with stroke fellow Dr. Arredondo under supervision of attending Dr. Janett Dobbs. Also discussed with general neurology attending Dr. Nguyen. Case and plan not final until Attending attestation.    p[atient was discharged prior to attending evaluation  residesnts spoke with general neuorlogy attending dr. nguyen

## 2025-05-18 NOTE — ED PROVIDER NOTE - CLINICAL SUMMARY MEDICAL DECISION MAKING FREE TEXT BOX
89-year-old female past medical history with past medical history A-fib previously on anticoagulation complicated by GI bleeding now off anticoagulation presenting for right-sided weakness and speech difficulty. With initial vital signs nonactionable.  Presenting as code stroke and evaluated with neurology at bedside.  With acute onset right-sided weakness and speech difficulty that is now improving, with improved speech here in the emergency department and only with right lower extremity drift against gravity, presenting within time window for thrombolytics however with significant GI bleeding history poor thrombolytics candidate.  Concern for TIA versus CVA versus metabolic abnormality.  Evaluate labs, CT, appreciate neurology recs. STROKE CODE   89-year-old female past medical history with past medical history A-fib previously on anticoagulation complicated by GI bleeding now off anticoagulation presenting for right-sided weakness and speech difficulty. With initial vital signs nonactionable.  Presenting as code stroke and evaluated with neurology at bedside.  With acute onset right-sided weakness and speech difficulty that is now improving, with improved speech here in the emergency department and only with right lower extremity drift against gravity, presenting within time window for thrombolytics however with significant GI bleeding history poor thrombolytics candidate.  Concern for TIA versus CVA versus metabolic abnormality.  Evaluate labs, CT, appreciate neurology recs. ZR

## 2025-05-18 NOTE — CONSULT NOTE ADULT - SUBJECTIVE AND OBJECTIVE BOX
**STROKE CODE CONSULT NOTE**    Last known well time/Time of onset of symptoms: 5/18 @0930    HPI: Ms. Oliver is a 89 year-old Swedish-speaking female with PMHx HTN, HLD, pAfib not on AC due to rectal bleeding, TIA, Parkinson disease (follows with Dr. Machado), on daily aspirin who presented to the ED as code stroke. History obtained with assistance from patient's daughter,  Kaden Oliver, who also assisted with translation. At baseline, patient is dependent in all ADLs, ambulates with a walker. Her daughter was bathing her this morning when the patient developed sudden onset right-sided weakness and difficulty speaking, after which EMS was called. Daughter reports that patient has been having episodes of Afib over the past few days. Daughter was concerned that patient's Sinemet, which often causes her orthostatic hypotension, may have been contributing to this and therefore she had her mother discontinued the medication 5 days ago. Patient was previously on AC for Afib, however has been off for several years since developing bleeding from a rectal polyp. Upon arrival to the ED, BP was 135/67.     Upon neurology assessment, patient states that she is feeling better, however reports muscle spasm in her right arm and leg.    PAST MEDICAL & SURGICAL HISTORY:  Atrial fibrillation  paroxysmal      Mild HTN      High cholesterol      H/O rectal polypectomy      S/P hysterectomy      S/P hemorrhoidectomy          FAMILY HISTORY:      SOCIAL HISTORY:  Smoking Cessation: Nonsmoker    ROS:  Constitutional: No fever, weight loss or fatigue  Eyes: No eye pain, visual disturbances, or discharge  ENMT:  No difficulty hearing, tinnitus, vertigo; No sinus or throat pain  Neck: No pain or stiffness  Respiratory: No cough, wheezing, chills or hemoptysis  Cardiovascular: No chest pain, palpitations, shortness of breath, dizziness or leg swelling  Gastrointestinal: No abdominal pain. No nausea, vomiting or hematemesis; No diarrhea or constipation. No hematochezia.  Genitourinary: No dysuria, frequency, hematuria or incontinence  Neurological: As per HPI  Skin: No itching, burning, rashes or lesions   Endocrine: No heat or cold intolerance; No hair loss  Musculoskeletal: No joint pain or swelling; No muscle, back or extremity pain  Psychiatric: No depression, anxiety, mood swings or difficulty sleeping  Heme/Lymph: No easy bruising or bleeding gums    MEDICATIONS  (STANDING):    MEDICATIONS  (PRN):      Allergies    No Known Allergies    Intolerances        Vital Signs Last 24 Hrs  T(C): 36.7 (18 May 2025 11:23), Max: 36.7 (18 May 2025 11:13)  T(F): 98 (18 May 2025 11:23), Max: 98 (18 May 2025 11:13)  HR: 65 (18 May 2025 11:23) (65 - 67)  BP: 140/68 (18 May 2025 11:23) (135/67 - 140/68)  BP(mean): --  RR: 18 (18 May 2025 11:23) (18 - 18)  SpO2: 98% (18 May 2025 11:23) (97% - 98%)    Parameters below as of 18 May 2025 11:23  Patient On (Oxygen Delivery Method): room air        PHYSICAL EXAM:  Constitutional: Elderly female, masked facies  Neurologic:  Mental status: Awake, alert and oriented x2 (baseline). Naming, repetition intact, able to follow simple commands.  No dysarthria, no aphasia.      Cranial nerves: Pupils equally round and reactive to light, visual fields full, no nystagmus, extraocular muscles intact, V1 through V3 intact bilaterally and symmetric, face symmetric, tongue was midline.    Motor:  Hypertonia and cogwheel rigidity noted throughout R>L, no drift of upper extremities, mild drift of RLE.   strength 5/5. LLE 4+/5 throughout, RLE 4/5 throughout.   Sensation: Intact to light touch, proprioception, and pinprick.  No neglect.   Coordination: No dysmetria on finger-to-nose.  No tremor noted. +bradykinesias  Reflexes: Deferred due to focused exam  Gait: Not assessed due to patient safety/fall risk  NIHSS: 2    Fingerstick Blood Glucose: CAPILLARY BLOOD GLUCOSE      POCT Blood Glucose.: 122 mg/dL (18 May 2025 10:33)       LABS:                        10.6   4.98  )-----------( 194      ( 18 May 2025 10:42 )             33.6     05-18    139  |  105  |  25[H]  ----------------------------<  129[H]  4.5   |  24  |  1.04    Ca    9.5      18 May 2025 10:42    TPro  6.5  /  Alb  3.9  /  TBili  0.5  /  DBili  x   /  AST  18  /  ALT  10  /  AlkPhos  75  05-18    PT/INR - ( 18 May 2025 10:42 )   PT: 11.0 sec;   INR: 0.96 ratio         PTT - ( 18 May 2025 10:42 )  PTT:31.8 sec      Urinalysis Basic - ( 18 May 2025 10:42 )    Color: x / Appearance: x / SG: x / pH: x  Gluc: 129 mg/dL / Ketone: x  / Bili: x / Urobili: x   Blood: x / Protein: x / Nitrite: x   Leuk Esterase: x / RBC: x / WBC x   Sq Epi: x / Non Sq Epi: x / Bacteria: x        RADIOLOGY & ADDITIONAL STUDIES:  < from: CT Brain Stroke Protocol (05.18.25 @ 10:56) >  IMPRESSION:  No acute intracranial hemorrhage, mass effect, or midline shift.    < end of copied text >    < from: CT Angio Brain Stroke Protocol  w/ IV Cont (05.18.25 @ 10:56) >  IMPRESSION:  CT perfusion demonstrates delayed mean transit time at the medial left   posterior frontal and parietal lobe with mismatched volume of 14 mL.   Ischemic penumbra not definitely excluded. MRI exam recommended    No hemodynamically significant stenosis in the neck.    Narrowing at the supraclinoid bilateral internal carotid arteries.    < end of copied text >   **STROKE CODE CONSULT NOTE**    Last known well time/Time of onset of symptoms: 5/18 @0930    HPI: Ms. Oliver is a 89 year-old Spanish-speaking female with PMHx HTN, HLD, pAfib not on AC due to rectal bleeding, TIA, Parkinson disease (follows with Dr. Machado), on daily aspirin who presented to the ED as code stroke. History obtained with assistance from patient's daughter,  Kaden Oliver, who also assisted with translation. At baseline, patient is dependent in all ADLs, ambulates with a walker. Her daughter was bathing her this morning when the patient developed sudden onset right-sided weakness and difficulty speaking, after which EMS was called. Daughter reports that patient has been having episodes of Afib over the past few days. Daughter was concerned that patient's Sinemet, which often causes her orthostatic hypotension, may have been contributing to this and therefore she had her mother discontinued the medication 5 days ago. Patient was previously on AC for Afib, however has been off for several years since developing bleeding from a rectal polyp. Upon arrival to the ED, BP was 135/67.     Upon neurology assessment, patient states that she is feeling better, however reports muscle spasm in her right arm and leg.    PAST MEDICAL & SURGICAL HISTORY:  Atrial fibrillation  paroxysmal      Mild HTN      High cholesterol      H/O rectal polypectomy      S/P hysterectomy      S/P hemorrhoidectomy          FAMILY HISTORY:      SOCIAL HISTORY:  Smoking Cessation: Nonsmoker    ROS:  Constitutional: No fever, weight loss or fatigue  Eyes: No eye pain, visual disturbances, or discharge  ENMT:  No difficulty hearing, tinnitus, vertigo; No sinus or throat pain  Neck: No pain or stiffness  Respiratory: No cough, wheezing, chills or hemoptysis  Cardiovascular: No chest pain, palpitations, shortness of breath, dizziness or leg swelling  Gastrointestinal: No abdominal pain. No nausea, vomiting or hematemesis; No diarrhea or constipation. No hematochezia.  Genitourinary: No dysuria, frequency, hematuria or incontinence  Neurological: As per HPI  Skin: No itching, burning, rashes or lesions   Endocrine: No heat or cold intolerance; No hair loss  Musculoskeletal: No joint pain or swelling; No muscle, back or extremity pain  Psychiatric: No depression, anxiety, mood swings or difficulty sleeping  Heme/Lymph: No easy bruising or bleeding gums    MEDICATIONS  (STANDING):    MEDICATIONS  (PRN):      Allergies    No Known Allergies    Intolerances        Vital Signs Last 24 Hrs  T(C): 36.7 (18 May 2025 11:23), Max: 36.7 (18 May 2025 11:13)  T(F): 98 (18 May 2025 11:23), Max: 98 (18 May 2025 11:13)  HR: 65 (18 May 2025 11:23) (65 - 67)  BP: 140/68 (18 May 2025 11:23) (135/67 - 140/68)  BP(mean): --  RR: 18 (18 May 2025 11:23) (18 - 18)  SpO2: 98% (18 May 2025 11:23) (97% - 98%)    Parameters below as of 18 May 2025 11:23  Patient On (Oxygen Delivery Method): room air        PHYSICAL EXAM:  Constitutional: Elderly female, masked facies  Neurologic:  Mental status: Awake, alert and oriented x2 (baseline). Naming, repetition intact, able to follow simple commands.  No dysarthria, no aphasia, bradyphrenic and hypophonic.      Cranial nerves: Pupils equally round and reactive to light, visual fields full, no nystagmus, extraocular muscles intact, V1 through V3 intact bilaterally and symmetric, face symmetric, tongue was midline.    Motor:  Hypertonia and cogwheel rigidity noted throughout R>L, no drift of upper extremities, mild drift of RLE.   strength 5/5. LLE 4+/5 throughout, RLE 4/5 throughout.   Sensation: Intact to light touch, proprioception, and pinprick.  No neglect.   Coordination: No dysmetria on finger-to-nose.  No tremor noted. +diffuse bradykinesia  Reflexes: Deferred due to focused exam  Gait: Not assessed due to patient safety/fall risk  NIHSS: 2    Fingerstick Blood Glucose: CAPILLARY BLOOD GLUCOSE      POCT Blood Glucose.: 122 mg/dL (18 May 2025 10:33)       LABS:                        10.6   4.98  )-----------( 194      ( 18 May 2025 10:42 )             33.6     05-18    139  |  105  |  25[H]  ----------------------------<  129[H]  4.5   |  24  |  1.04    Ca    9.5      18 May 2025 10:42    TPro  6.5  /  Alb  3.9  /  TBili  0.5  /  DBili  x   /  AST  18  /  ALT  10  /  AlkPhos  75  05-18    PT/INR - ( 18 May 2025 10:42 )   PT: 11.0 sec;   INR: 0.96 ratio         PTT - ( 18 May 2025 10:42 )  PTT:31.8 sec      Urinalysis Basic - ( 18 May 2025 10:42 )    Color: x / Appearance: x / SG: x / pH: x  Gluc: 129 mg/dL / Ketone: x  / Bili: x / Urobili: x   Blood: x / Protein: x / Nitrite: x   Leuk Esterase: x / RBC: x / WBC x   Sq Epi: x / Non Sq Epi: x / Bacteria: x        RADIOLOGY & ADDITIONAL STUDIES:  < from: CT Brain Stroke Protocol (05.18.25 @ 10:56) >  IMPRESSION:  No acute intracranial hemorrhage, mass effect, or midline shift.    < end of copied text >    < from: CT Angio Brain Stroke Protocol  w/ IV Cont (05.18.25 @ 10:56) >  IMPRESSION:  CT perfusion demonstrates delayed mean transit time at the medial left   posterior frontal and parietal lobe with mismatched volume of 14 mL.   Ischemic penumbra not definitely excluded. MRI exam recommended    No hemodynamically significant stenosis in the neck.    Narrowing at the supraclinoid bilateral internal carotid arteries.    < end of copied text >

## 2025-05-18 NOTE — ED PROVIDER NOTE - PATIENT PORTAL LINK FT
You can access the FollowMyHealth Patient Portal offered by [facility] by registering at the following website: [email or web address]. By joining [facility]’s FollowMyHealth portal, you will also be able to view your health information using other applications (apps) compatible with our system.

## 2025-05-18 NOTE — ED PROVIDER NOTE - PROGRESS NOTE DETAILS
Kobi Banks MD PGY3: Result significant for 14 mm mismatch to perfusion, discussed with neurology, given patient demographics and comorbidities including risk of bleeding, no further management inpatient at this time.  Recommend outpatient follow-up.  Continue aspirin, no further medication changes.  Discussed with daughter at bedside, amenable to plan.  Daughter will arrange transportation for patient.  Discussed with patient all results including any incidentals. Discussed discharge, follow up, return precautions, medications. Patient verbalizes understanding and is amenable at this time. Answered patient questions.

## (undated) DEVICE — SENSOR O2 FINGER ADULT

## (undated) DEVICE — FOLEY HOLDER STATLOCK 2 WAY ADULT

## (undated) DEVICE — SOL INJ NS 0.9% 500ML 2 PORT

## (undated) DEVICE — SUCTION YANKAUER NO CONTROL VENT

## (undated) DEVICE — TUBING SUCTION 20FT

## (undated) DEVICE — BIOPSY FORCEP RADIAL JAW 4 STANDARD WITH NEEDLE

## (undated) DEVICE — CATH IV SAFE BC 20G X 1.16" (PINK)

## (undated) DEVICE — TUBING IV SET GRAVITY 3Y 100" MACRO

## (undated) DEVICE — Device

## (undated) DEVICE — CATH IV SAFE BC 22G X 1" (BLUE)

## (undated) DEVICE — PACK IV START WITH CHG

## (undated) DEVICE — TUBING SUCTION CONN 6FT STERILE

## (undated) DEVICE — TUBING CAP SET ENDO 24HR USE GI